# Patient Record
Sex: FEMALE | Race: WHITE | NOT HISPANIC OR LATINO | Employment: UNEMPLOYED | URBAN - NONMETROPOLITAN AREA
[De-identification: names, ages, dates, MRNs, and addresses within clinical notes are randomized per-mention and may not be internally consistent; named-entity substitution may affect disease eponyms.]

---

## 2023-11-27 ENCOUNTER — OFFICE VISIT (OUTPATIENT)
Dept: URGENT CARE | Facility: CLINIC | Age: 88
End: 2023-11-27
Payer: MEDICARE

## 2023-11-27 VITALS
TEMPERATURE: 98 F | DIASTOLIC BLOOD PRESSURE: 62 MMHG | HEART RATE: 87 BPM | SYSTOLIC BLOOD PRESSURE: 112 MMHG | OXYGEN SATURATION: 98 % | RESPIRATION RATE: 17 BRPM

## 2023-11-27 DIAGNOSIS — T85.848A DENTAL IMPLANT PAIN, INITIAL ENCOUNTER: Primary | ICD-10-CM

## 2023-11-27 PROCEDURE — G0463 HOSPITAL OUTPT CLINIC VISIT: HCPCS | Performed by: STUDENT IN AN ORGANIZED HEALTH CARE EDUCATION/TRAINING PROGRAM

## 2023-11-27 PROCEDURE — 99203 OFFICE O/P NEW LOW 30 MIN: CPT | Performed by: STUDENT IN AN ORGANIZED HEALTH CARE EDUCATION/TRAINING PROGRAM

## 2023-11-27 RX ORDER — B-COMPLEX WITH VITAMIN C
1 TABLET ORAL DAILY
COMMUNITY

## 2023-11-27 RX ORDER — MIRABEGRON 50 MG/1
50 TABLET, FILM COATED, EXTENDED RELEASE ORAL DAILY
COMMUNITY
Start: 2023-10-27

## 2023-11-27 RX ORDER — METHENAMINE HIPPURATE 1000 MG/1
1 TABLET ORAL
COMMUNITY
Start: 2023-10-30 | End: 2024-10-29

## 2023-11-27 RX ORDER — LISINOPRIL 10 MG/1
10 TABLET ORAL DAILY
COMMUNITY

## 2023-11-27 NOTE — PATIENT INSTRUCTIONS
Take Tylenol (1000 mg) and Ibuprofen (600 mg) every 6 hrs as needed for pain  Follow up with a dentist as soon as possible

## 2023-11-27 NOTE — PROGRESS NOTES
[facility]        NAME: Paola Enamorado is a 80 y.o. female  : 10/16/1927    MRN: [id number]    Assessment and Plan   Dental implant pain, initial encounter [T24.816C]  1. Dental implant pain, initial encounter          No concern for infection. Pain due to implant in place. Discussed pain regimen for management till follow up with dentist tomorrow. Patient Instructions       Follow up with PCP in 3-5 days. Proceed to  ER if symptoms worsen. Chief Complaint     Chief Complaint   Patient presents with    gum pain     Started 2 days ago  Lower gum pain         History of Present Illness       HPI    P/w dtr. Had teeth extracted a month ago and then had a plate placed 2 weeks ago. They were bothering her so she went back to dentist who sanded them down and asked pt to use polydent which helped a little. Day after thanksgiving 3 days ago, she noticed worsening pain- she was tolerating soft foods well prior to this, thinks maybe eating pizza may have caused the worsening. Tried tylenol 500mg 1-2x daily with some relief. Called dentist today but office is closed. Denies bleeding, discharge, drooling, neck pain, neck rigidity. Review of Systems   Review of Systems   Constitutional:  Negative for chills and fever. HENT:  Positive for dental problem. Negative for ear pain and sore throat. Eyes:  Negative for pain and visual disturbance. Respiratory:  Negative for cough, chest tightness and shortness of breath. Cardiovascular:  Negative for chest pain and palpitations. Gastrointestinal:  Negative for abdominal pain, constipation, diarrhea, nausea and vomiting. Genitourinary:  Negative for dysuria, hematuria and menstrual problem. Musculoskeletal:  Negative for arthralgias and back pain. Skin:  Negative for color change and rash. Neurological:  Negative for seizures and syncope. Psychiatric/Behavioral:  Negative for dysphoric mood and suicidal ideas.     All other systems reviewed and are negative. Current Medications       Current Outpatient Medications:     calcium carbonate-vitamin D 500 mg-5 mcg per tablet, Take 1 tablet by mouth daily, Disp: , Rfl:     lisinopril (ZESTRIL) 10 mg tablet, Take 10 mg by mouth daily, Disp: , Rfl:     methenamine hippurate (HIPREX) 1 g tablet, Take 1 g by mouth, Disp: , Rfl:     Myrbetriq 50 MG TB24, Take 50 mg by mouth daily, Disp: , Rfl:     Current Allergies     Allergies as of 11/27/2023    (No Known Allergies)            The following portions of the patient's history were reviewed and updated as appropriate: allergies, current medications, past family history, past medical history, past social history, past surgical history and problem list.     Past Medical History:   Diagnosis Date    Bladder disorder     Emphysema of lung ([address])     Hypertension     Macular degeneration        Past Surgical History:   Procedure Laterality Date    BREAST SURGERY      CHOLECYSTECTOMY         History reviewed. No pertinent family history. Medications have been verified. Objective   /62   Pulse 87   Temp 98 °F (36.7 °C)   Resp 17   SpO2 98%        Physical Exam     Physical Exam  Constitutional:       General: She is not in acute distress. Appearance: Normal appearance. HENT:      Head: Normocephalic and atraumatic. Mouth/Throat:      Dentition: Gingival swelling present. Comments: Mild swelling and erythema over anterior lower gum and right posterior gums, tender to palpation, no purulence or bleeding, no fluctuance, no mass or abscess noted  Eyes:      Extraocular Movements: Extraocular movements intact. Conjunctiva/sclera: Conjunctivae normal.   Cardiovascular:      Rate and Rhythm: Normal rate. Pulmonary:      Effort: Pulmonary effort is normal. No respiratory distress. Skin:     General: Skin is warm and dry. Neurological:      General: No focal deficit present.       Mental Status: She is alert and oriented to person, place, and time.    Psychiatric:         Mood and Affect: Mood normal.         Behavior: Behavior normal.